# Patient Record
Sex: MALE | Race: WHITE | NOT HISPANIC OR LATINO
[De-identification: names, ages, dates, MRNs, and addresses within clinical notes are randomized per-mention and may not be internally consistent; named-entity substitution may affect disease eponyms.]

---

## 2022-06-01 ENCOUNTER — NON-APPOINTMENT (OUTPATIENT)
Age: 67
End: 2022-06-01

## 2022-06-01 ENCOUNTER — APPOINTMENT (OUTPATIENT)
Dept: GASTROENTEROLOGY | Facility: CLINIC | Age: 67
End: 2022-06-01
Payer: COMMERCIAL

## 2022-06-01 VITALS
SYSTOLIC BLOOD PRESSURE: 150 MMHG | TEMPERATURE: 98 F | WEIGHT: 286 LBS | DIASTOLIC BLOOD PRESSURE: 90 MMHG | RESPIRATION RATE: 16 BRPM | BODY MASS INDEX: 37.91 KG/M2 | HEIGHT: 73 IN | HEART RATE: 76 BPM | OXYGEN SATURATION: 97 %

## 2022-06-01 DIAGNOSIS — Z87.891 PERSONAL HISTORY OF NICOTINE DEPENDENCE: ICD-10-CM

## 2022-06-01 DIAGNOSIS — Z80.42 FAMILY HISTORY OF MALIGNANT NEOPLASM OF PROSTATE: ICD-10-CM

## 2022-06-01 DIAGNOSIS — Z78.9 OTHER SPECIFIED HEALTH STATUS: ICD-10-CM

## 2022-06-01 PROCEDURE — 99204 OFFICE O/P NEW MOD 45 MIN: CPT

## 2022-06-01 RX ORDER — DILTIAZEM HYDROCHLORIDE 180 MG/1
180 CAPSULE, EXTENDED RELEASE ORAL
Refills: 0 | Status: ACTIVE | COMMUNITY
Start: 2022-06-01

## 2022-06-01 RX ORDER — TEPROTUMUMAB 500 MG/1
500 INJECTION, POWDER, LYOPHILIZED, FOR SOLUTION INTRAVENOUS
Refills: 0 | Status: ACTIVE | COMMUNITY
Start: 2022-06-01

## 2022-06-01 RX ORDER — METOPROLOL TARTRATE 100 MG/1
100 TABLET, FILM COATED ORAL
Refills: 0 | Status: ACTIVE | COMMUNITY
Start: 2022-06-01

## 2022-06-01 RX ORDER — METFORMIN HYDROCHLORIDE 1000 MG/1
1000 TABLET, FILM COATED, EXTENDED RELEASE ORAL
Refills: 0 | Status: ACTIVE | COMMUNITY
Start: 2022-06-01

## 2022-06-01 RX ORDER — SEMAGLUTIDE 1.34 MG/ML
2 INJECTION, SOLUTION SUBCUTANEOUS
Refills: 0 | Status: ACTIVE | COMMUNITY
Start: 2022-06-01

## 2022-06-01 RX ORDER — APIXABAN 5 MG/1
5 TABLET, FILM COATED ORAL
Qty: 30 | Refills: 0 | Status: ACTIVE | COMMUNITY
Start: 2022-06-01

## 2022-06-01 RX ORDER — METHIMAZOLE 5 MG/1
5 TABLET ORAL
Refills: 0 | Status: ACTIVE | COMMUNITY
Start: 2022-06-01

## 2022-06-01 NOTE — ASSESSMENT
[FreeTextEntry1] : Patient is a 66 year year old male, with PMH of a.fib, HTN, DM, grave's disease, who presents for evaluation of pancreatic lesion seen on MRI. MRI showed 1.2 cm arterial enhancing focus, possible focal neuroendocrine lesion vs nonspecific lesion, recommending EUS. \par \par EUS with FNA to be scheduled to r/o further evaluate this pancreatic lesion. Indication, risks and benefit of procedure discussed with patient in detail. All questions answered. Patient is medically optimized for EUS with FNA. \par \par Labs ordered including CBC, CMP, PT/INR, CA 19-9 and Chromogranin A\par \par Will need to hold Eliquis prior to EUS. PT will see cardiology (Dr. Winn) for cardiac clearance. \par \par Pt has a personal h/o colon polyps and is due for colon cancer screening. Will do EUS at this time and he will f/u with primary GI for colonoscopy.

## 2022-06-01 NOTE — ADDENDUM
[FreeTextEntry1] : I, Deepa Anderson PA-C, acted as a scribe for the services dictated to me by DEBI Sagastume in this document on Jun 01, 2022 for CRYSTAL KAPLAN .\par \par I have personally seen and examined the patient. I agree with the history, physical examination, assessment and recommendations as noted above.\par \par Debi Miles MD\par Gastroenterology\par

## 2022-06-01 NOTE — PHYSICAL EXAM
Infection due to 2019 novel coronavirus [General Appearance - Alert] : alert [General Appearance - In No Acute Distress] : in no acute distress [Sclera] : the sclera and conjunctiva were normal [PERRL With Normal Accommodation] : pupils were equal in size, round, and reactive to light [Extraocular Movements] : extraocular movements were intact [Outer Ear] : the ears and nose were normal in appearance [Neck Appearance] : the appearance of the neck was normal [Auscultation Breath Sounds / Voice Sounds] : lungs were clear to auscultation bilaterally [Heart Rate And Rhythm] : heart rate was normal and rhythm regular [Heart Sounds] : normal S1 and S2 [Heart Sounds Gallop] : no gallops [Murmurs] : no murmurs [Heart Sounds Pericardial Friction Rub] : no pericardial rub [Bowel Sounds] : normal bowel sounds [Abdomen Soft] : soft [Abdomen Tenderness] : non-tender [] : no hepato-splenomegaly [Abdomen Mass (___ Cm)] : no abdominal mass palpated [No Focal Deficits] : no focal deficits [Oriented To Time, Place, And Person] : oriented to person, place, and time [Impaired Insight] : insight and judgment were intact [Affect] : the affect was normal

## 2022-06-01 NOTE — HISTORY OF PRESENT ILLNESS
[de-identified] : Patient is a 66 year year old male, with PMH of a.fib, HTN, DM, grave's disease, who presents for evaluation of pancreatic lesion seen on MRI. \par \par Pt referred by Dr. Abebe. Patient had been having pain to the coccyx region, no known injury. He had a CT abdomen which showed possible liver lesion and went on to have MRI which found negative liver lesion but showed 1.2cm pancreatic arterially enhancing focus in distal body, thought to be a neuroendocrine tumor by radiology. He was referred for evaluation with EUS. \par \par Pt overall feeling well. He mentions a 20lb weight loss in December 2021 due to COVID then more weight loss and early satiety 2/2 recently starting Ozempic. He was advised to stop Ozempic by endocrinologist when he was found to have a pancreatic lesion, last dose was yesterday. \par \par Patient denies any significant pulmonary conditions. \par \par Patient denies pyrosis, dysphagia, abdominal pain, change in BMs, rectal bleeding, nausea, vomiting, or unexplained weight loss.\par \par Pt has a personal h/o colon polyps, last colonoscopy was 7 years ago with Dr. Abebe.

## 2022-06-26 ENCOUNTER — LABORATORY RESULT (OUTPATIENT)
Age: 67
End: 2022-06-26

## 2022-06-27 DIAGNOSIS — Z00.00 ENCOUNTER FOR GENERAL ADULT MEDICAL EXAMINATION W/OUT ABNORMAL FINDINGS: ICD-10-CM

## 2022-06-30 ENCOUNTER — RESULT REVIEW (OUTPATIENT)
Age: 67
End: 2022-06-30

## 2022-06-30 ENCOUNTER — TRANSCRIPTION ENCOUNTER (OUTPATIENT)
Age: 67
End: 2022-06-30

## 2022-06-30 ENCOUNTER — APPOINTMENT (OUTPATIENT)
Dept: GASTROENTEROLOGY | Facility: HOSPITAL | Age: 67
End: 2022-06-30

## 2022-06-30 ENCOUNTER — OUTPATIENT (OUTPATIENT)
Dept: OUTPATIENT SERVICES | Facility: HOSPITAL | Age: 67
LOS: 1 days | End: 2022-06-30
Payer: COMMERCIAL

## 2022-06-30 DIAGNOSIS — K86.9 DISEASE OF PANCREAS, UNSPECIFIED: ICD-10-CM

## 2022-06-30 LAB
GLUCOSE BLDC GLUCOMTR-MCNC: 200 MG/DL — HIGH (ref 70–99)
GLUCOSE BLDC GLUCOMTR-MCNC: 240 MG/DL — HIGH (ref 70–99)
GLUCOSE BLDC GLUCOMTR-MCNC: 264 MG/DL — HIGH (ref 70–99)

## 2022-06-30 PROCEDURE — 82962 GLUCOSE BLOOD TEST: CPT

## 2022-06-30 PROCEDURE — 43239 EGD BIOPSY SINGLE/MULTIPLE: CPT | Mod: 59

## 2022-06-30 PROCEDURE — 43239 EGD BIOPSY SINGLE/MULTIPLE: CPT | Mod: XS

## 2022-06-30 PROCEDURE — 43242 EGD US FINE NEEDLE BX/ASPIR: CPT

## 2022-06-30 PROCEDURE — 88173 CYTOPATH EVAL FNA REPORT: CPT | Mod: 26

## 2022-06-30 PROCEDURE — 88342 IMHCHEM/IMCYTCHM 1ST ANTB: CPT

## 2022-06-30 PROCEDURE — 88173 CYTOPATH EVAL FNA REPORT: CPT

## 2022-06-30 PROCEDURE — 88305 TISSUE EXAM BY PATHOLOGIST: CPT | Mod: 26

## 2022-06-30 PROCEDURE — 43238 EGD US FINE NEEDLE BX/ASPIR: CPT

## 2022-06-30 PROCEDURE — 88342 IMHCHEM/IMCYTCHM 1ST ANTB: CPT | Mod: 26

## 2022-06-30 PROCEDURE — 88341 IMHCHEM/IMCYTCHM EA ADD ANTB: CPT | Mod: 26

## 2022-06-30 PROCEDURE — 88341 IMHCHEM/IMCYTCHM EA ADD ANTB: CPT

## 2022-06-30 RX ORDER — INSULIN HUMAN 100 [IU]/ML
10 INJECTION, SOLUTION SUBCUTANEOUS ONCE
Refills: 0 | Status: DISCONTINUED | OUTPATIENT
Start: 2022-06-30 | End: 2022-07-15

## 2022-07-06 LAB — SURGICAL PATHOLOGY STUDY: SIGNIFICANT CHANGE UP

## 2022-07-08 LAB — NON-GYNECOLOGICAL CYTOLOGY STUDY: SIGNIFICANT CHANGE UP

## 2022-08-16 ENCOUNTER — TRANSCRIPTION ENCOUNTER (OUTPATIENT)
Age: 67
End: 2022-08-16

## 2022-08-17 PROBLEM — K86.9 PANCREATIC LESION: Status: ACTIVE | Noted: 2022-06-01

## 2022-08-18 ENCOUNTER — NON-APPOINTMENT (OUTPATIENT)
Age: 67
End: 2022-08-18

## 2022-08-18 ENCOUNTER — APPOINTMENT (OUTPATIENT)
Dept: SURGICAL ONCOLOGY | Facility: CLINIC | Age: 67
End: 2022-08-18

## 2022-08-18 VITALS
HEIGHT: 73 IN | BODY MASS INDEX: 37.11 KG/M2 | SYSTOLIC BLOOD PRESSURE: 135 MMHG | WEIGHT: 280 LBS | DIASTOLIC BLOOD PRESSURE: 87 MMHG | OXYGEN SATURATION: 95 % | HEART RATE: 82 BPM | TEMPERATURE: 97.1 F

## 2022-08-18 DIAGNOSIS — Z86.39 PERSONAL HISTORY OF OTHER ENDOCRINE, NUTRITIONAL AND METABOLIC DISEASE: ICD-10-CM

## 2022-08-18 DIAGNOSIS — K86.9 DISEASE OF PANCREAS, UNSPECIFIED: ICD-10-CM

## 2022-08-18 DIAGNOSIS — Z86.79 PERSONAL HISTORY OF OTHER DISEASES OF THE CIRCULATORY SYSTEM: ICD-10-CM

## 2022-08-18 PROCEDURE — 99204 OFFICE O/P NEW MOD 45 MIN: CPT

## 2022-09-13 NOTE — PHYSICAL EXAM
[Normal] : oriented to person, place and time, with appropriate affect [de-identified] : soft NT ND

## 2022-09-13 NOTE — HISTORY OF PRESENT ILLNESS
[de-identified] : Mr. Jf Hopkins is a 66 year old male who presents for an initial consultation for neuroendocrine tumor.\par \par He complained of rectal pain and a CT A/P was performed on 4/28/2022 which showed two new lesion within the liver, indeterminate. Underlying hepatocellular carcinoma cannot be excluded. Somewhat limited eval of pelvis due to metal artifact from right hip arthroplasty.\par MRI liver performed on 5/12/22 which showed distal pancreatic body 1.2 cm arterial enhancing focus, may represent focal neuroendocrine lesion vs. other nonspecific lesion. EUS eval recommended. No focal hepatic lesion. Moderate diffused hepatic steatosis with 1.5 cm focal fatty deposition corresponding to CT abnormality in the region of the hepatic hilum. Mild splenomegaly. 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

## 2022-09-13 NOTE — CONSULT LETTER
[Dear  ___] : Dear  [unfilled], [Consult Letter:] : I had the pleasure of evaluating your patient, [unfilled]. [Please see my note below.] : Please see my note below. [Consult Closing:] : Thank you very much for allowing me to participate in the care of this patient.  If you have any questions, please do not hesitate to contact me. [Sincerely,] : Sincerely, [FreeTextEntry3] : Darrel Cotton MD, MPH, FACS, FSSO\par , [facility] General Surgical Oncology Fellowship\par [facility] [place]\par Associate Professor of Surgery\par [employer] [facility]  [DrKelle  ___] : Dr. GUTIÉRREZ [DrKelle ___] : Dr. GUTIÉRREZ

## 2022-09-15 ENCOUNTER — APPOINTMENT (OUTPATIENT)
Dept: SURGICAL ONCOLOGY | Facility: CLINIC | Age: 67
End: 2022-09-15

## 2022-09-29 ENCOUNTER — APPOINTMENT (OUTPATIENT)
Dept: SURGICAL ONCOLOGY | Facility: CLINIC | Age: 67
End: 2022-09-29

## 2022-09-29 VITALS
TEMPERATURE: 97.2 F | SYSTOLIC BLOOD PRESSURE: 152 MMHG | HEART RATE: 71 BPM | OXYGEN SATURATION: 95 % | BODY MASS INDEX: 37.11 KG/M2 | WEIGHT: 280 LBS | DIASTOLIC BLOOD PRESSURE: 91 MMHG | HEIGHT: 73 IN

## 2022-09-29 PROCEDURE — 99214 OFFICE O/P EST MOD 30 MIN: CPT

## 2022-12-07 NOTE — CONSULT LETTER
[Dear  ___] : Dear  [unfilled], [Consult Letter:] : I had the pleasure of evaluating your patient, [unfilled]. [Please see my note below.] : Please see my note below. [Consult Closing:] : Thank you very much for allowing me to participate in the care of this patient.  If you have any questions, please do not hesitate to contact me. [Sincerely,] : Sincerely, [FreeTextEntry3] : Darrel Cotton MD, MPH, FACS, FSSO\par , [facility] General Surgical Oncology Fellowship\par [facility] [place]\par Associate Professor of Surgery\par [employer] [facility]  [DrKelle  ___] : Dr. GUTIÉRREZ [DrKelle ___] : Dr. GUTIÉRREZ

## 2022-12-07 NOTE — HISTORY OF PRESENT ILLNESS
[de-identified] : Mr. Jf Hopkins is a 66 year old male who returns for follow up.  He was initially seen in consultation on 8/18/22 for pancreatic neuroendocrine tumor.\par \par He complained of rectal pain and a CT A/P was performed on 4/28/2022 which showed two new lesion within the liver, indeterminate. Underlying hepatocellular carcinoma cannot be excluded. Somewhat limited eval of pelvis due to metal artifact from right hip arthroplasty.\par \par MRI liver performed on 5/12/22 which showed distal pancreatic body 1.2 cm arterial enhancing focus, may represent focal neuroendocrine lesion vs. other nonspecific lesion. EUS eval recommended. No focal hepatic lesion. Moderate diffused hepatic steatosis with 1.5 cm focal fatty deposition corresponding to CT abnormality in the region of the hepatic hilum. Mild splenomegaly. 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  Denies any pain, weight loss, fever/chills, flushing, diarrhea or new onset diabetes (dx with diabetes 1/2022, sugars stable).

## 2022-12-07 NOTE — PHYSICAL EXAM
[Normal] : oriented to person, place and time, with appropriate affect [de-identified] : soft NT ND

## 2022-12-07 NOTE — ASSESSMENT
[FreeTextEntry1] : IMP: Mr. Jf Hopkins is a 66 year old male who was sen on 8/18/22 for an initial consultation for neuroendocrine tumor.\par \par He complained of rectal pain and a CT A/P was performed on 4/28/2022 which showed two new lesion within the liver, indeterminate. Underlying hepatocellular carcinoma cannot be excluded. Somewhat limited eval of pelvis due to metal artifact from right hip arthroplasty.\par MRI liver performed on 5/12/22 which showed distal pancreatic body 1.2 cm arterial enhancing focus, may represent focal neuroendocrine lesion vs. other nonspecific lesion. EUS eval recommended. No focal hepatic lesion. Moderate diffused hepatic steatosis with 1.5 cm focal fatty deposition corresponding to CT abnormality in the region of the hepatic hilum. Mild splenomegaly. \par EGD/EUS 6/30/22- Lesion of pancreas- FNA positive for neoplasm\par \par Plan:\par 1) We spoke about the small lesion and that the guidelines state that at this size it can be either removed or observed, NANETS guidelines 1-2 cm can take either route.  Looking back at previous imaging, it seems like the lesion may have been there in 2017. As a result, we are leaning towards observation at this time.\par 2) RTO in 6 months\par 3) Repeat imaging for surveillance

## 2022-12-14 PROBLEM — D3A.8 PRIMARY PANCREATIC NEUROENDOCRINE TUMOR: Status: ACTIVE | Noted: 2022-09-29

## 2022-12-15 ENCOUNTER — APPOINTMENT (OUTPATIENT)
Dept: SURGICAL ONCOLOGY | Facility: CLINIC | Age: 67
End: 2022-12-15

## 2022-12-15 VITALS
SYSTOLIC BLOOD PRESSURE: 130 MMHG | DIASTOLIC BLOOD PRESSURE: 32 MMHG | HEART RATE: 90 BPM | WEIGHT: 300 LBS | HEIGHT: 73 IN | OXYGEN SATURATION: 95 % | BODY MASS INDEX: 39.76 KG/M2 | TEMPERATURE: 97.8 F

## 2022-12-15 DIAGNOSIS — D3A.8 OTHER BENIGN NEUROENDOCRINE TUMORS: ICD-10-CM

## 2022-12-15 PROCEDURE — 99213 OFFICE O/P EST LOW 20 MIN: CPT

## 2022-12-27 NOTE — ASSESSMENT
[FreeTextEntry1] : IMP: \par 66yo M w/ PNET, initially seen on 8/18/22\par \par He complained of rectal pain and a CT A/P was performed on 4/28/2022 which showed two new lesion within the liver, indeterminate. Underlying hepatocellular carcinoma cannot be excluded. Somewhat limited eval of pelvis due to metal artifact from right hip arthroplasty.\par MRI liver performed on 5/12/22 which showed distal pancreatic body 1.2 cm arterial enhancing focus, may represent focal neuroendocrine lesion vs. other nonspecific lesion. EUS eval recommended. No focal hepatic lesion. Moderate diffused hepatic steatosis with 1.5 cm focal fatty deposition corresponding to CT abnormality in the region of the hepatic hilum. Mild splenomegaly. \par EGD/EUS 6/30/22- Lesion of pancreas- FNA positive for neoplasm\par \par CT A/P (@ [facility]) from 12/5/22 shows a 1.2 cm enhancing nodule in the pancreatic tail (area of known PNET?)\par \par Plan:\par 1) repeat imaging in one year\par 2) No surgical intervention at this time

## 2022-12-27 NOTE — HISTORY OF PRESENT ILLNESS
[de-identified] : Mr. Jf Hopkins is a 67 year old male who returns for follow up.  \par \par He was initially seen in consultation on 8/18/22 for pancreatic neuroendocrine tumor.\par \par He complained of rectal pain and a CT A/P was performed on 4/28/2022 which showed two new lesion within the liver, indeterminate. Underlying hepatocellular carcinoma cannot be excluded. Somewhat limited eval of pelvis due to metal artifact from right hip arthroplasty.\par \par MRI liver performed on 5/12/22 which showed distal pancreatic body 1.2 cm arterial enhancing focus, may represent focal neuroendocrine lesion vs. other nonspecific lesion. EUS eval recommended. No focal hepatic lesion. Moderate diffused hepatic steatosis with 1.5 cm focal fatty deposition corresponding to CT abnormality in the region of the hepatic hilum. Mild splenomegaly. \par \par Labs 6/27/22: CA 19-9: < 3 | Chromogranin A: no result on file\par EGD/EUS 6/30/22- Lesion of pancreas- FNA positive for neuroendocrine neoplasm \par \par 9/29/22- He returns after obtaining prior records to discuss ongoing management.  Denies any pain, weight loss, fever/chills, flushing, diarrhea or new onset diabetes (dx with diabetes 1/2022, sugars stable). We spoke about the small lesion and that the guidelines state that at this size it can be either removed or observed, NANETS guidelines 1-2 cm can take either route.  Looking back at previous imaging, it seems like the lesion may have been there in 2017. As a result, we are leaning towards observation at this time. RTO in 6 months\par \par CT A/P (@ [facility]) from 12/5/22 shows a 1.2 cm enhancing nodule in the pancreatic tail (area of known PNET?)\par \par 12/15/22- Jf is feeling well without complaints. Denies abdominal pain, nausea, vomiting, changes in appetite, night sweats or change in bowel habits. No unintentional weight loss or fatigue. 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

## 2022-12-27 NOTE — PHYSICAL EXAM
[Normal] : oriented to person, place and time, with appropriate affect [de-identified] : soft NT ND

## 2022-12-27 NOTE — CONSULT LETTER
[Dear  ___] : Dear  [unfilled], [Consult Letter:] : I had the pleasure of evaluating your patient, [unfilled]. [Please see my note below.] : Please see my note below. [Consult Closing:] : Thank you very much for allowing me to participate in the care of this patient.  If you have any questions, please do not hesitate to contact me. [Sincerely,] : Sincerely, [DrKelle  ___] : Dr. GUTIÉRREZ [DrKelle ___] : Dr. GUTIÉRREZ [FreeTextEntry3] : Darrel Cotton MD, MPH, FACS, FSSO\par , [facility] General Surgical Oncology Fellowship\par [facility] [place]\par Associate Professor of Surgery\par [employer] [facility]

## 2023-11-01 ENCOUNTER — OFFICE (OUTPATIENT)
Facility: LOCATION | Age: 68
Setting detail: OPHTHALMOLOGY
End: 2023-11-01

## 2023-11-01 ENCOUNTER — OFFICE (OUTPATIENT)
Facility: LOCATION | Age: 68
Setting detail: OPHTHALMOLOGY
End: 2023-11-01
Payer: COMMERCIAL

## 2023-11-01 VITALS — HEIGHT: 60 IN

## 2023-11-01 DIAGNOSIS — Z13.5: ICD-10-CM
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
## 2023-11-01 DIAGNOSIS — H31.29: ICD-10-CM

## 2023-11-01 DIAGNOSIS — H25.13: ICD-10-CM

## 2023-11-01 DIAGNOSIS — H35.363: ICD-10-CM

## 2023-11-01 DIAGNOSIS — H04.123: ICD-10-CM

## 2023-11-01 DIAGNOSIS — E11.9: ICD-10-CM

## 2023-11-01 PROCEDURE — 92014 COMPRE OPH EXAM EST PT 1/>: CPT | Performed by: OPHTHALMOLOGY

## 2023-11-01 PROCEDURE — 92250 FUNDUS PHOTOGRAPHY W/I&R: CPT | Mod: GY | Performed by: OPHTHALMOLOGY

## 2023-11-01 PROCEDURE — 92015 DETERMINE REFRACTIVE STATE: CPT | Performed by: OPHTHALMOLOGY

## 2023-11-01 ASSESSMENT — LID POSITION - DERMATOCHALASIS
OS_DERMATOCHALASIS: LUL
OD_DERMATOCHALASIS: RUL

## 2023-11-01 ASSESSMENT — CONFRONTATIONAL VISUAL FIELD TEST (CVF)
OD_FINDINGS: FULL
OS_FINDINGS: FULL

## 2023-11-01 ASSESSMENT — SUPERFICIAL PUNCTATE KERATITIS (SPK)
OS_SPK: T
OD_SPK: T

## 2023-11-08 PROBLEM — E11.9 TYPE 2 DIABETES MELLITUS WITHOUT COMPLICATIONS ; BOTH EYES: Status: ACTIVE | Noted: 2023-11-01

## 2023-11-08 PROBLEM — H25.13 CATARACT NUCLEAR SCLEROSIS AGE RELATED; BOTH EYES: Status: ACTIVE | Noted: 2023-11-01

## 2023-11-08 PROBLEM — H31.29 PERIPAPILLARY ATROPHY ; BOTH EYES: Status: ACTIVE | Noted: 2023-11-01

## 2023-11-08 ASSESSMENT — REFRACTION_MANIFEST
OD_VA1: 20/50+1
OD_SPHERE: +0.75
OD_ADD: +2.75
OS_SPHERE: +1.50
OD_AXIS: 180
OS_AXIS: 170
OD_CYLINDER: +1.00
OS_VA1: 20/30+2
OS_CYLINDER: +1.50
OS_ADD: +2.75

## 2023-11-08 ASSESSMENT — REFRACTION_AUTOREFRACTION
OS_AXIS: 167
OD_CYLINDER: +1.75
OD_AXIS: 172
OS_CYLINDER: +1.50
OD_SPHERE: +0.75
OS_SPHERE: +1.75

## 2023-11-08 ASSESSMENT — SPHEQUIV_DERIVED
OD_SPHEQUIV: 1.625
OD_SPHEQUIV: 1.25
OS_SPHEQUIV: 2.25
OS_SPHEQUIV: 2.5

## 2023-11-08 ASSESSMENT — REFRACTION_CURRENTRX
OS_SPHERE: +3.50
OS_CYLINDER: +1.50
OD_AXIS: 7
OS_OVR_VA: 20/
OD_VPRISM_DIRECTION: SV
OD_CYLINDER: +0.75
OD_OVR_VA: 20/
OS_AXIS: 168
OS_VPRISM_DIRECTION: SV
OD_SPHERE: +4.25

## 2024-01-03 ENCOUNTER — APPOINTMENT (OUTPATIENT)
Dept: SURGICAL ONCOLOGY | Facility: CLINIC | Age: 69
End: 2024-01-03
Payer: COMMERCIAL

## 2024-01-03 VITALS
OXYGEN SATURATION: 94 % | BODY MASS INDEX: 41.42 KG/M2 | TEMPERATURE: 97.6 F | HEIGHT: 73 IN | WEIGHT: 312.51 LBS | HEART RATE: 81 BPM

## 2024-01-03 DIAGNOSIS — D3A.8 OTHER BENIGN NEUROENDOCRINE TUMORS: ICD-10-CM

## 2024-01-03 PROCEDURE — 99024 POSTOP FOLLOW-UP VISIT: CPT

## 2024-01-03 NOTE — HISTORY OF PRESENT ILLNESS
[de-identified] : Mr. Jf Hopkins is a 67 year old male who returns for follow up.    He was initially seen in consultation on 8/18/22 for pancreatic neuroendocrine tumor.  He complained of rectal pain and a CT A/P was performed on 4/28/2022 which showed two new lesion within the liver, indeterminate. Underlying hepatocellular carcinoma cannot be excluded. Somewhat limited eval of pelvis due to metal artifact from right hip arthroplasty.  MRI liver performed on 5/12/22 which showed distal pancreatic body 1.2 cm arterial enhancing focus, may represent focal neuroendocrine lesion vs. other nonspecific lesion. EUS eval recommended. No focal hepatic lesion. Moderate diffused hepatic steatosis with 1.5 cm focal fatty deposition corresponding to CT abnormality in the region of the hepatic hilum. Mild splenomegaly.   Labs 6/27/22: CA 19-9: < 3 | Chromogranin A: no result on file EGD/EUS 6/30/22- Lesion of pancreas- FNA positive for neuroendocrine neoplasm   9/29/22- He returns after obtaining prior records to discuss ongoing management.  Denies any pain, weight loss, fever/chills, flushing, diarrhea or new onset diabetes (dx with diabetes 1/2022, sugars stable). We spoke about the small lesion and that the guidelines state that at this size it can be either removed or observed, NANETS guidelines 1-2 cm can take either route.  Looking back at previous imaging, it seems like the lesion may have been there in 2017. As a result, we are leaning towards observation at this time. RTO in 6 months  CT A/P (@ [facility]) from 12/5/22 shows a 1.2 cm enhancing nodule in the pancreatic tail (area of known PNET?)  12/15/22- Jf is feeling well without complaints. Denies abdominal pain, nausea, vomiting, changes in appetite, night sweats or change in bowel habits. No unintentional weight loss or fatigue.   PMH:Afib on ELIQUIS, HTN, HLD, DM on metformin PSH: Right total hip arthroplasty  Family Hx: Father with prostate cancer, sister with stage 1 lung cancer, paternal grandmother with breast cancer. 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 No ductal dilation. Stabe 1.4cm grossly IMPRESSION -Cholelithaisis with suggestion of acute cholecystitis in the appropriate clinical setting. -No biliary dialation or filling defect -Stable enhancing lesion in the pancreatic tail. Consider EUS if no contraindication exist. ***LABS*** 12/21/23 Chromogranin A: 90 WNL  1/03/24- pt is accompanied by his wife and is feeling well without complaints. Denies abdominal pain, nausea, vomiting, changes in appetite, night sweats or change in bowel habits. No unintentional weight loss or fatigue.

## 2024-01-03 NOTE — ASSESSMENT
[FreeTextEntry1] : Primary pancreatic neuroendocrine tumor (209.69) (D3A.8) IMP:  67yo M w/ PNET, initially seen on 8/18/22  He complained of rectal pain and a CT A/P was performed on 4/28/2022 which showed two new lesion within the liver, indeterminate. Underlying hepatocellular carcinoma cannot be excluded. Somewhat limited eval of pelvis due to metal artifact from right hip arthroplasty.  MRI liver performed on 5/12/22 which showed distal pancreatic body 1.2 cm arterial enhancing focus, may represent focal neuroendocrine lesion vs. other nonspecific lesion. EUS eval recommended. No focal hepatic lesion. Moderate diffused hepatic steatosis with 1.5 cm focal fatty deposition corresponding to CT abnormality in the region of the hepatic hilum. Mild splenomegaly.  EGD/EUS 6/30/22- Lesion of pancreas- FNA positive for neoplasm  CT A/P (@ [facility]) from 12/5/22 shows a 1.2 cm enhancing nodule in the pancreatic tail (area of known PNET?  INTERVAL HISTORY ***MRI/MRCP ABD*** 12/18/23 FINDINGS: -Pancreas: Normal T1 hyperintense signal. No ductal dilation. Stabe 1.4cm grossly IMPRESSION -Cholelithaisis with suggestion of acute cholecystitis in the appropriate clinical setting. -No biliary dialation or filling defect -Stable enhancing lesion in the pancreatic tail. Consider EUS if no contraindication exist. ***LABS*** 12/21/23 Chromogranin A: 90 WNL  1/03/24- pt is accompanied by his wife and is feeling well without complaints. Denies abdominal pain, nausea, vomiting, changes in appetite, night sweats or change in bowel habits. No unintentional weight loss or fatigue. His MRI demonstrated a .2cm change from previous imaging and Chromogranin WNL. He still remains asymptomatic. Explanation regarding EUS recommendation was provided from recent imaging in which the pt was not favor of. I explained that this was advised as to ensure no changes have incurred as far as how neuroendocrine neoplasm has been behaving. Education was provided as to why to pursue EUS but preferred surveillance CT and lab work in 6 months. Should any flagged finding present, we would revisit discussion for a plan.   Plan:  1) repeat CT in 6 months 2) repeat CMP and Chromogranin A in 6 months 3) RTO in 1 year unless lab work/ imaging warrants a different plan  4)No surgical intervention at this time.

## 2024-01-03 NOTE — PHYSICAL EXAM
[Normal] : oriented to person, place and time, with appropriate affect [de-identified] : soft NT ND

## 2024-09-05 ENCOUNTER — OFFICE (OUTPATIENT)
Facility: LOCATION | Age: 69
Setting detail: OPHTHALMOLOGY
End: 2024-09-05
Payer: COMMERCIAL

## 2024-09-05 DIAGNOSIS — E11.9: ICD-10-CM

## 2024-09-05 DIAGNOSIS — H35.363: ICD-10-CM

## 2024-09-05 DIAGNOSIS — H25.13: ICD-10-CM

## 2024-09-05 DIAGNOSIS — H33.312: ICD-10-CM

## 2024-09-05 PROCEDURE — 92250 FUNDUS PHOTOGRAPHY W/I&R: CPT | Performed by: OPHTHALMOLOGY

## 2024-09-05 PROCEDURE — 92014 COMPRE OPH EXAM EST PT 1/>: CPT | Performed by: OPHTHALMOLOGY

## 2024-09-05 ASSESSMENT — LID POSITION - DERMATOCHALASIS
OS_DERMATOCHALASIS: LUL
OD_DERMATOCHALASIS: RUL

## 2024-09-05 ASSESSMENT — CONFRONTATIONAL VISUAL FIELD TEST (CVF)
OS_FINDINGS: FULL
OD_FINDINGS: FULL

## 2024-09-06 PROBLEM — E05.00 THYROID EYE DISEASE: Status: ACTIVE | Noted: 2024-09-05

## 2024-09-06 PROBLEM — H25.13 CATARACT SENILE NUCLEAR SCLEROSIS: Status: ACTIVE | Noted: 2024-09-05

## 2024-09-09 PROBLEM — H33.103 RETINOSCHISIS; BOTH EYES: Status: ACTIVE | Noted: 2024-09-05

## 2025-02-20 ENCOUNTER — APPOINTMENT (OUTPATIENT)
Dept: SURGICAL ONCOLOGY | Facility: CLINIC | Age: 70
End: 2025-02-20

## 2025-06-12 ENCOUNTER — NON-APPOINTMENT (OUTPATIENT)
Age: 70
End: 2025-06-12

## (undated) DEVICE — SYS BIOPSY NDL FILE SS STRL 25G

## (undated) DEVICE — FORCEP RADIAL JAW 4 W NDL 2.4MM 2.8MM 240CM ORANGE DISP

## (undated) DEVICE — SSH-EBUS GFUE160AL5 1312469: Type: DURABLE MEDICAL EQUIPMENT

## (undated) DEVICE — SSH-EBUS GFUCT180 1311057: Type: DURABLE MEDICAL EQUIPMENT